# Patient Record
(demographics unavailable — no encounter records)

---

## 2024-10-09 NOTE — ADDENDUM
[FreeTextEntry1] : Documented by Lovely Dent acting as a scribe for Dr. Constantin Grant on 10/09/2024. All medical record entries made by the Scribe were at my, Dr. Constantin Grant's, direction and personally dictated by me on 10/09/2024. I have reviewed the chart and agree that the record accurately reflects my personal performance of the history, physical exam, assessment and plan. I have also personally directed, reviewed, and agree with the discharge instructions.

## 2024-10-09 NOTE — REASON FOR VISIT
[Follow-Up] : a follow-up visit [TextBox_44] : SOB, COPD, chronic respiratory failure, lung cancer screening (8 mm LINDA nodule), nicotine addiction, ?OSAS

## 2024-10-09 NOTE — PHYSICAL EXAM
[No Acute Distress] : no acute distress [Normal Oropharynx] : normal oropharynx [III] : Mallampati Class: III [Normal Appearance] : normal appearance [No Neck Mass] : no neck mass [Normal Rate/Rhythm] : normal rate/rhythm [Normal S1, S2] : normal s1, s2 [No Murmurs] : no murmurs [No Resp Distress] : no resp distress [No Abnormalities] : no abnormalities [Benign] : benign [Normal Gait] : normal gait [No Clubbing] : no clubbing [No Cyanosis] : no cyanosis [No Edema] : no edema [FROM] : FROM [Normal Color/ Pigmentation] : normal color/ pigmentation [No Focal Deficits] : no focal deficits [Oriented x3] : oriented x3 [Normal Affect] : normal affect [TextBox_68] : I:E ratio 1:3; expiratory wheezes B/L [TextBox_105] : boot on L leg

## 2024-10-09 NOTE — PROCEDURE
[FreeTextEntry1] : PFTs revealed severe obstructive dysfunction; FEV1 was 1.10L, which is 45% of predicted; normal flow volume loop. PFTs were performed to evaluate for SOB  FENO was 5; a normal value being less than 25 Fractional exhaled nitric oxide (FENO) is regarded as a simple, noninvasive method for assessing eosinophilic airway inflammation. Produced by a variety of cells within the lung, nitric oxide (NO) concentrations are generally low in healthy individuals. However, high concentrations of NO appear to be involved in nonspecific host defense mechanisms and chronic inflammatory diseases such as asthma. The American Thoracic Society (ATS) therefore has recommended using FENO to aid in the diagnosis and monitoring of eosinophilic airway inflammation and asthma, and for identifying steroid responsive individuals whose chronic respiratory symptoms may be caused by airway inflammation.

## 2024-10-09 NOTE — HISTORY OF PRESENT ILLNESS
[Current] : current [>= 20 pack years] : >= 20 pack years [TextBox_4] : Ms. GARAY is a 67-year-old, currently smoking, female.  She has past medical history of severe COPD, abnormal CAT scan with pulmonary nodule, & nicotine dependence. She presents for a follow-up pulmonary evaluation. Her chief complaint is  -she notes she's still smoking cigarettes, though she's cut down. She's still coughing -she notes bowels are regular for her -she notes smoking 5-7 cigarettes per day -she denies heartburn or reflux -she notes poor quality sleep. She's working night shifts at [employer] -she doesn't know if she's snoring -she notes sleeping for 6 hours (5AM-11AM). When she first gets out of work, she can't fall asleep -she denies dysphonia  -she denies sinus congestion, rhinitis, or PNDrip  -she notes her biggest complaint is SOB with stairs and exercise -she notes using Trelegy 200 -she notes she had the PNA vaccine. She hasn't yet had the flu shot  -she denies any headaches, nausea, emesis, fever, chills, sweats, chest pain, chest pressure, coughing, wheezing, palpitations, diarrhea, constipation, dysphagia, vertigo, arthralgias, myalgias, leg swelling, itchy eyes, itchy ears, heartburn, reflux, or sour taste in the mouth. [TextBox_11] : 1 [TextBox_13] : 50

## 2024-10-09 NOTE — ASSESSMENT
[FreeTextEntry1] : Ms. GARAY is a 67-year-old, currently smoking, female.  She has past medical history of severe COPD, abnormal CAT scan with pulmonary nodule, & nicotine dependence- still smoking ?OSAS  Her shortness of breath is multifactorial due to: -poor mechanics of breathing -pulmonary disease   -COPD   -chronic respiratory failure -?cardiac disease   Problem 1: COPD -continue Trelegy 200 1 puff QD or Breztri 2 puffs BID  -continue DuoNeb via nebulizer, Q6H or Albuterol via inhaler 2 puffs up to Q6H, pre-exercise PRN  -COPD is a progressive disease and although it can't be cured, appropriate management can slow its progression, reduce frequency and severity of exacerbations, improve symptoms, and the patient's quality of life. Hospitalizations are the greatest contributor to the total COPD costs and account for up to 87% of total COPD related costs. Exacerbations are the main cause of admissions and subsequently account for the 40-75% of COPD costs. Inhaled maintenance therapy reduces the incidence of exacerbations in patients with stable COPD. Incorrect inhaler use and nonadherence are major obstacles to achieving COPD treatment goals. Many COPD patients have challenges (impaired inhalation, limited dexterity, reduced cognition) that limit their ability to correctly use their COPD treatment devices resulting in reduced symptom control. Of most importance is smoking cessation, early intervention with respiratory illnesses, and contemplation for pulmonary rehab to enhance quality of life.  -Inhaler technique reviewed as well as oral hygiene techniques reviewed with patient. Avoidance of cold air, extremes of temperature, rescue inhaler should be used before exercise. Order of medication reviewed with patient. Recommended adequate hydration and use of a cool mist humidifier in the bedroom    Problem 2: Chronic Respiratory Failure -Patient is in a stable state -Requires 3 L nasal cannula pulse dose - portable -Tests results (overnight oximetry, 6 minute walk test, exercise study, arterial blood gas, etc.) reveal that oxygen is necessary for daily life- optimally it should be used with any activity and during sleep    Problem 3: Nicotine Addiction, smoking cessation discussed 10/09/2024  -continue using 21 mg Nicotine patches -instructions for use of Wellbutrin and NRT were provided prior  -Discussed for five minutes with the patient the risks/associations with continued smoking including COPD, emphysema, shortness of breath, renal cancer, bladder cancer, stroke risk, cardiac disease, etc. Smoking cessation was discussed at length and highly encouraged. Various options to aid cessation was discussed including use of Chantix, Nicotrol, nicotine products, laser therapy, hypnosis, Wellbutrin, etc   Problem 4: Lung Cancer Screening (8 mm LINDA nodule since 3/2022) -next chest CT 6/2025 -The American Cancer Society now recommends that individuals aged 50 to 80 years who currently smoke, or formerly smoked, and are at high risk for lung cancer because of a >20 pack-year history of cigarette smoking undergo annual lung cancer screening with LDCT. The recommendations eliminated years since quitting as a criterium for evaluation for lung cancer screening.    Problem 5: ?DELMY (elevated Mallampati class, unrefreshed sleep) -complete home sleep study -Sleep apnea is associated with adverse clinical consequences which can affect most organ systems. Cardiovascular disease risk includes arrhythmias, atrial fibrillation, hypertension, coronary artery disease, and stroke. Metabolic disorders include diabetes type 2, non-alcoholic fatty liver disease. Mood disorder especially depression; and cognitive decline especially in the elderly. Associations with chronic reflux/Lim's esophagus some but not all inclusive. -Reasons include arousal consistent with hypopnea; respiratory events most prominent in REM sleep or supine position; therefore sleep staging and body position are important for accurate diagnosis and estimation of AHI.    Problem 6: cardiac disease -recommended to follow up with Cardiologist (s/p evaluation)   Problem 7: poor breathing mechanics -Recommended Nanette Guthrie breathing technique -Proper breathing techniques were reviewed with an emphasis on exhalation. Patient was instructed to breathe in for 1 second and out for 4 seconds. The patient was encouraged to not talk while walking.  Problem 8: health maintenance -recommended yearly flu shot after October 15, 2024 -recommended strep pneumonia vaccines: Prevnar-20 vaccine, followed by Pneumo vaccine 23 one year following after 65 years old. -recommended early intervention for Upper Respiratory Infections (URIs) -recommended regular osteoporosis evaluations -recommended early dermatological evaluations -recommended after the age of 50 to the age of 70, colonoscopy every 5 years   F/P in 3-4 months  Patient is encouraged to call or fax the office with any changes, questions, or concerns.

## 2025-07-16 NOTE — HISTORY OF PRESENT ILLNESS
[Current] : current [>= 20 pack years] : >= 20 pack years [TextBox_4] : Ms. GARAY is a 68-year-old, currently smoking, female.  She has past medical history of severe COPD, abnormal CAT scan with pulmonary nodule, & nicotine dependence. She presents for a follow-up pulmonary evaluation. Her chief complaint is  -she notes doing well -she notes vision is stable  -she notes bowels are regular -she notes exercising more -she notes diet is stable -she notes appetite is stable -she notes she stopped smoking 3 days ago -she notes she doesn't know if she snores -she notes gaining weight 5lbs -she notes coughing and wheezing  -she notes energy levels are 6-7/10 -she notes taking Buproprion, albuterol and Trelegy.  -she denies any headaches, nausea, emesis, fever, chills, sweats, chest pain, chest pressure, palpitations, diarrhea, constipation, dysphagia, vertigo, arthralgias, myalgias, leg swelling, itchy eyes, itchy ears, heartburn, reflux, or sour taste in the mouth.  [TextBox_11] : 1 [TextBox_13] : 50

## 2025-07-16 NOTE — ASSESSMENT
[FreeTextEntry1] : Ms. GARAY is a 68-year-old, currently smoking, female.  She has past medical history of severe COPD, abnormal CAT scan with pulmonary nodule, & nicotine dependence- still smoking ?OSAS- stable except weight gain   Her shortness of breath is multifactorial due to: -poor mechanics of breathing -pulmonary disease   -COPD   -chronic respiratory failure -?cardiac disease   Problem 1: COPD  -add Spiriva at 2 inhalations QAM   -add Symbicort 160 2 inhalations BID  -COPD is a progressive disease and although it can't be cured, appropriate management can slow its progression, reduce frequency and severity of exacerbations, improve symptoms, and the patient's quality of life. Hospitalizations are the greatest contributor to the total COPD costs and account for up to 87% of total COPD related costs. Exacerbations are the main cause of admissions and subsequently account for the 40-75% of COPD costs. Inhaled maintenance therapy reduces the incidence of exacerbations in patients with stable COPD. Incorrect inhaler use and nonadherence are major obstacles to achieving COPD treatment goals. Many COPD patients have challenges (impaired inhalation, limited dexterity, reduced cognition) that limit their ability to correctly use their COPD treatment devices resulting in reduced symptom control. Of most importance is smoking cessation, early intervention with respiratory illnesses, and contemplation for pulmonary rehab to enhance quality of life.  -Inhaler technique reviewed as well as oral hygiene techniques reviewed with patient. Avoidance of cold air, extremes of temperature, rescue inhaler should be used before exercise. Order of medication reviewed with patient. Recommended adequate hydration and use of a cool mist humidifier in the bedroom    Problem 1A: COPD Biologic (Nucala)  - 07/16/2025   -Add Nucala -The safety and efficacy of Nucala was established in three double-blind, randomized, placebo-controlled trials in patients with severe asthma. Compared to a placebo, patients with severe asthma receiving Nucala had fewer exacerbation requiring hospitalization and/or emergency department visits, and a longer time to first exacerbation. In addition, patients with severe asthma receiving Nucala or Fasenra experienced greater reductions in their daily maintenance oral corticosteroid dose, while maintaining asthma control compared with patients receiving placebo. Treatment with Nucala did not result in a significant improvement in lung function, as measured by the volume of air exhaled by patients in one second. The most common side effects include: headache, injection site reactions, back pain, weakness, and fatigue; hypersensitivity reactions can occur within hours or days including swelling of the face, mouth, and tongue, fainting, dizziness, hives, breathing problems, and rash; herpes zoster infections have occurred. The drug is a monoclonal antibody that inhibits interleukin-5 which helps regular eosinophils, a type of white blood cell that contributes to asthma. The over-production of eosinophils can cause inflammation in the lungs, increasing the frequency of asthma attacks. Patients must also take other medications, including high dose inhaled corticosteroids and at least one additional asthma drug.   Problem 2: Chronic Respiratory Failure -Patient is in a stable state -Requires 3 L nasal cannula pulse dose - portable -Tests results (overnight oximetry, 6 minute walk test, exercise study, arterial blood gas, etc.) reveal that oxygen is necessary for daily life- optimally it should be used with any activity and during sleep    Problem 3: Nicotine Addiction, smoking cessation discussed 07/2025 -continue using 21 mg Nicotine patches -instructions for use of Wellbutrin and NRT were provided prior  -Discussed for five minutes with the patient the risks/associations with continued smoking including COPD, emphysema, shortness of breath, renal cancer, bladder cancer, stroke risk, cardiac disease, etc. Smoking cessation was discussed at length and highly encouraged. Various options to aid cessation was discussed including use of Chantix, Nicotrol, nicotine products, laser therapy, hypnosis, Wellbutrin, etc   Problem 4: Lung Cancer Screening (8 mm LINDA nodule since 3/2022) -next chest CT 7/2025 -The American Cancer Society now recommends that individuals aged 50 to 80 years who currently smoke, or formerly smoked, and are at high risk for lung cancer because of a >20 pack-year history of cigarette smoking undergo annual lung cancer screening with LDCT. The recommendations eliminated years since quitting as a criterium for evaluation for lung cancer screening.    Problem 5: ?DELMY (elevated Mallampati class, unrefreshed sleep) -complete home sleep study -Sleep apnea is associated with adverse clinical consequences which can affect most organ systems. Cardiovascular disease risk includes arrhythmias, atrial fibrillation, hypertension, coronary artery disease, and stroke. Metabolic disorders include diabetes type 2, non-alcoholic fatty liver disease. Mood disorder especially depression; and cognitive decline especially in the elderly. Associations with chronic reflux/Lim's esophagus some but not all inclusive. -Reasons include arousal consistent with hypopnea; respiratory events most prominent in REM sleep or supine position; therefore sleep staging and body position are important for accurate diagnosis and estimation of AHI.    Problem 6: cardiac disease -recommended to follow up with Cardiologist (s/p evaluation)   Problem 7: poor breathing mechanics -Recommended Nanette Guthrie breathing technique -Proper breathing techniques were reviewed with an emphasis on exhalation. Patient was instructed to breathe in for 1 second and out for 4 seconds. The patient was encouraged to not talk while walking.  Problem 8: health maintenance -recommended yearly flu shot after October 15, 2024 -recommended strep pneumonia vaccines: Prevnar-20 vaccine, followed by Pneumo vaccine 23 one year following after 65 years old. -recommended early intervention for Upper Respiratory Infections (URIs) -recommended regular osteoporosis evaluations -recommended early dermatological evaluations -recommended after the age of 50 to the age of 70, colonoscopy every 5 years   F/P in 3-4 months  Patient is encouraged to call or fax the office with any changes, questions, or concerns

## 2025-07-16 NOTE — ADDENDUM
[FreeTextEntry1] : Documented by Melissa Valdez acting as a scribe for Dr. Constantin Grant on 07/16/2025. All medical record entries made by the Scribe were at my, Dr. Constantin Grant's, direction and personally dictated by me on 07/16/2025.  I have reviewed the chart and agree that the record accurately reflects my personal performance of the history, physical exam, assessment and plan. I have also personally directed, reviewed, and agree with the discharge instructions.

## 2025-07-16 NOTE — PROCEDURE
[FreeTextEntry1] : Full PFT reveals severe obstruction dysfunction; FEV1 was 0.98  L which is40 % of predicted;  hyperinflation; mildy-moderately reducedl diffusion at 11.85 , which is 58 % of predicted; normal flow volume loop.  ALLISON test revealed mildly reduced MIP max of  54%; normal MEP max of  147% PFTs were performed to evaluate for COPD FENO was 5 ; a normal value being less than 25   Fractional exhaled nitric oxide (FENO) is regarded as a simple, noninvasive method for assessing eosinophilic airway inflammation. Produced by a variety of cells within the lung, nitric oxide (NO) concentrations are generally low in healthy individuals. However, high concentrations of NO appear to be involved in nonspecific host defense mechanisms and chronic inflammatory diseases such as asthma. The American Thoracic Society (ATS) therefore has recommended using FENO to aid in the diagnosis and monitoring of eosinophilic airway inflammation and asthma, and for identifying steroid responsive individuals whose chronic respiratory symptoms may be caused by airway inflammation. 6 minute walk test reveals a low saturation of 91 %, walked 326  meters